# Patient Record
Sex: MALE
[De-identification: names, ages, dates, MRNs, and addresses within clinical notes are randomized per-mention and may not be internally consistent; named-entity substitution may affect disease eponyms.]

---

## 2022-06-19 ENCOUNTER — NURSE TRIAGE (OUTPATIENT)
Dept: OTHER | Facility: CLINIC | Age: 77
End: 2022-06-19

## 2022-06-20 NOTE — TELEPHONE ENCOUNTER
Tatianna Hensley, patient's nurse called back to say patient states he is not really much pain or nausea anymore. Patient re-triaged in light of no current symptoms.     Temperature 97.6    Reason for Disposition   [1] MODERATE pain (e.g., interferes with normal activities) AND [2] comes and goes (cramps) AND [3] present > 24 hours  (Exception: pain with Vomiting or Diarrhea - see that Guideline)    Protocols used: ABDOMINAL PAIN - UPPER-ADULT-

## 2022-06-20 NOTE — TELEPHONE ENCOUNTER
Subjective: Caller states \"he was telling me that after every meal his stomach starts hurting and he gets nauseous. \"     Current Symptoms: on pantoprazole, alison  Has had BM more than 3 days ago  Abdominal pain and nausea after eating x 2 days  Patient's nurse on phone says patient abdomen feels soft and non-tender. Has symptoms right now. Last ate about 2.5 hours ago. Pain is upper abdomen, left and right side. Pain begins right after eating. Associated Symptoms: NA    Pain Severity: 4/10; N/A; constant    Temperature: feels cold     What has been tried: had tramadol about 45 minutes ago for knee pain    Recommended disposition: go to ED now    Care advice provided, patient verbalizes understanding; denies any other questions or concerns. Outcome: Nurse will send patient to ED for evaluation    Reason for Disposition   Pain is mainly in upper abdomen  (if needed ask: \"is it mainly above the belly button? \")   [1] Pain lasts > 10 minutes AND [2] age > 50    Protocols used: ABDOMINAL PAIN - MALE-ADULT-, ABDOMINAL PAIN - UPPER-ADULT-